# Patient Record
Sex: FEMALE
[De-identification: names, ages, dates, MRNs, and addresses within clinical notes are randomized per-mention and may not be internally consistent; named-entity substitution may affect disease eponyms.]

---

## 2017-10-10 ENCOUNTER — RESULT REVIEW (OUTPATIENT)
Age: 52
End: 2017-10-10

## 2017-10-20 ENCOUNTER — OUTPATIENT (OUTPATIENT)
Dept: OUTPATIENT SERVICES | Facility: HOSPITAL | Age: 52
LOS: 1 days | Discharge: HOME | End: 2017-10-20

## 2017-10-20 DIAGNOSIS — C50.919 MALIGNANT NEOPLASM OF UNSPECIFIED SITE OF UNSPECIFIED FEMALE BREAST: ICD-10-CM

## 2017-10-25 ENCOUNTER — RESULT REVIEW (OUTPATIENT)
Age: 52
End: 2017-10-25

## 2017-10-25 ENCOUNTER — APPOINTMENT (OUTPATIENT)
Dept: HEMATOLOGY ONCOLOGY | Facility: CLINIC | Age: 52
End: 2017-10-25
Payer: COMMERCIAL

## 2017-10-25 VITALS
OXYGEN SATURATION: 100 % | DIASTOLIC BLOOD PRESSURE: 72 MMHG | BODY MASS INDEX: 22.53 KG/M2 | RESPIRATION RATE: 16 BRPM | WEIGHT: 138.67 LBS | HEART RATE: 64 BPM | TEMPERATURE: 98.4 F | SYSTOLIC BLOOD PRESSURE: 105 MMHG

## 2017-10-25 LAB
BASOPHILS # BLD AUTO: 0.1 K/UL — SIGNIFICANT CHANGE UP (ref 0–0.2)
BASOPHILS NFR BLD AUTO: 1.1 % — SIGNIFICANT CHANGE UP (ref 0–2)
EOSINOPHIL # BLD AUTO: 0.1 K/UL — SIGNIFICANT CHANGE UP (ref 0–0.5)
EOSINOPHIL NFR BLD AUTO: 1.4 % — SIGNIFICANT CHANGE UP (ref 0–6)
HCT VFR BLD CALC: 39.2 % — SIGNIFICANT CHANGE UP (ref 34.5–45)
HGB BLD-MCNC: 14 G/DL — SIGNIFICANT CHANGE UP (ref 11.5–15.5)
LYMPHOCYTES # BLD AUTO: 2.4 K/UL — SIGNIFICANT CHANGE UP (ref 1–3.3)
LYMPHOCYTES # BLD AUTO: 39.1 % — SIGNIFICANT CHANGE UP (ref 13–44)
MCHC RBC-ENTMCNC: 31.9 PG — SIGNIFICANT CHANGE UP (ref 27–34)
MCHC RBC-ENTMCNC: 35.6 G/DL — SIGNIFICANT CHANGE UP (ref 32–36)
MCV RBC AUTO: 89.8 FL — SIGNIFICANT CHANGE UP (ref 80–100)
MONOCYTES # BLD AUTO: 0.5 K/UL — SIGNIFICANT CHANGE UP (ref 0–0.9)
MONOCYTES NFR BLD AUTO: 8.3 % — SIGNIFICANT CHANGE UP (ref 2–14)
NEUTROPHILS # BLD AUTO: 3 K/UL — SIGNIFICANT CHANGE UP (ref 1.8–7.4)
NEUTROPHILS NFR BLD AUTO: 50.1 % — SIGNIFICANT CHANGE UP (ref 43–77)
PLATELET # BLD AUTO: 222 K/UL — SIGNIFICANT CHANGE UP (ref 150–400)
RBC # BLD: 4.37 M/UL — SIGNIFICANT CHANGE UP (ref 3.8–5.2)
RBC # FLD: 10.9 % — SIGNIFICANT CHANGE UP (ref 10.3–14.5)
WBC # BLD: 6 K/UL — SIGNIFICANT CHANGE UP (ref 3.8–10.5)
WBC # FLD AUTO: 6 K/UL — SIGNIFICANT CHANGE UP (ref 3.8–10.5)

## 2017-10-25 PROCEDURE — 99214 OFFICE O/P EST MOD 30 MIN: CPT

## 2019-01-04 LAB
ALBUMIN SERPL ELPH-MCNC: 4.4 G/DL
ALP BLD-CCNC: 61 U/L
ALT SERPL-CCNC: 19 U/L
ANION GAP SERPL CALC-SCNC: 13 MMOL/L
AST SERPL-CCNC: 21 U/L
BILIRUB SERPL-MCNC: 0.3 MG/DL
BUN SERPL-MCNC: 17 MG/DL
CALCIUM SERPL-MCNC: 9.8 MG/DL
CHLORIDE SERPL-SCNC: 103 MMOL/L
CO2 SERPL-SCNC: 26 MMOL/L
CREAT SERPL-MCNC: 0.98 MG/DL
GLUCOSE SERPL-MCNC: 92 MG/DL
POTASSIUM SERPL-SCNC: 4.7 MMOL/L
PROT SERPL-MCNC: 7.1 G/DL
SODIUM SERPL-SCNC: 142 MMOL/L

## 2019-01-22 ENCOUNTER — OUTPATIENT (OUTPATIENT)
Dept: OUTPATIENT SERVICES | Facility: HOSPITAL | Age: 54
LOS: 1 days | Discharge: HOME | End: 2019-01-22

## 2019-01-22 DIAGNOSIS — C50.919 MALIGNANT NEOPLASM OF UNSPECIFIED SITE OF UNSPECIFIED FEMALE BREAST: ICD-10-CM

## 2019-01-28 ENCOUNTER — APPOINTMENT (OUTPATIENT)
Dept: HEMATOLOGY ONCOLOGY | Facility: CLINIC | Age: 54
End: 2019-01-28

## 2019-03-21 ENCOUNTER — OUTPATIENT (OUTPATIENT)
Dept: OUTPATIENT SERVICES | Facility: HOSPITAL | Age: 54
LOS: 1 days | Discharge: HOME | End: 2019-03-21

## 2019-03-21 DIAGNOSIS — C50.919 MALIGNANT NEOPLASM OF UNSPECIFIED SITE OF UNSPECIFIED FEMALE BREAST: ICD-10-CM

## 2019-03-25 ENCOUNTER — RESULT REVIEW (OUTPATIENT)
Age: 54
End: 2019-03-25

## 2019-03-25 ENCOUNTER — APPOINTMENT (OUTPATIENT)
Dept: HEMATOLOGY ONCOLOGY | Facility: CLINIC | Age: 54
End: 2019-03-25
Payer: COMMERCIAL

## 2019-03-25 VITALS
OXYGEN SATURATION: 100 % | DIASTOLIC BLOOD PRESSURE: 76 MMHG | WEIGHT: 144.84 LBS | BODY MASS INDEX: 23.53 KG/M2 | SYSTOLIC BLOOD PRESSURE: 115 MMHG | RESPIRATION RATE: 24 BRPM | HEART RATE: 64 BPM | TEMPERATURE: 98.1 F

## 2019-03-25 DIAGNOSIS — E78.00 PURE HYPERCHOLESTEROLEMIA, UNSPECIFIED: ICD-10-CM

## 2019-03-25 DIAGNOSIS — M85.80 OTHER SPECIFIED DISORDERS OF BONE DENSITY AND STRUCTURE, UNSPECIFIED SITE: ICD-10-CM

## 2019-03-25 LAB
BASOPHILS # BLD AUTO: 0.1 K/UL — SIGNIFICANT CHANGE UP (ref 0–0.2)
BASOPHILS NFR BLD AUTO: 1.2 % — SIGNIFICANT CHANGE UP (ref 0–2)
EOSINOPHIL # BLD AUTO: 0.1 K/UL — SIGNIFICANT CHANGE UP (ref 0–0.5)
EOSINOPHIL NFR BLD AUTO: 1.7 % — SIGNIFICANT CHANGE UP (ref 0–6)
HCT VFR BLD CALC: 39 % — SIGNIFICANT CHANGE UP (ref 34.5–45)
HGB BLD-MCNC: 13.5 G/DL — SIGNIFICANT CHANGE UP (ref 11.5–15.5)
LYMPHOCYTES # BLD AUTO: 2 K/UL — SIGNIFICANT CHANGE UP (ref 1–3.3)
LYMPHOCYTES # BLD AUTO: 36.1 % — SIGNIFICANT CHANGE UP (ref 13–44)
MCHC RBC-ENTMCNC: 30.8 PG — SIGNIFICANT CHANGE UP (ref 27–34)
MCHC RBC-ENTMCNC: 34.6 G/DL — SIGNIFICANT CHANGE UP (ref 32–36)
MCV RBC AUTO: 88.9 FL — SIGNIFICANT CHANGE UP (ref 80–100)
MONOCYTES # BLD AUTO: 0.4 K/UL — SIGNIFICANT CHANGE UP (ref 0–0.9)
MONOCYTES NFR BLD AUTO: 7.3 % — SIGNIFICANT CHANGE UP (ref 2–14)
NEUTROPHILS # BLD AUTO: 3 K/UL — SIGNIFICANT CHANGE UP (ref 1.8–7.4)
NEUTROPHILS NFR BLD AUTO: 53.7 % — SIGNIFICANT CHANGE UP (ref 43–77)
PLATELET # BLD AUTO: 229 K/UL — SIGNIFICANT CHANGE UP (ref 150–400)
RBC # BLD: 4.39 M/UL — SIGNIFICANT CHANGE UP (ref 3.8–5.2)
RBC # FLD: 11.5 % — SIGNIFICANT CHANGE UP (ref 10.3–14.5)
WBC # BLD: 5.5 K/UL — SIGNIFICANT CHANGE UP (ref 3.8–10.5)
WBC # FLD AUTO: 5.5 K/UL — SIGNIFICANT CHANGE UP (ref 3.8–10.5)

## 2019-03-25 PROCEDURE — 99214 OFFICE O/P EST MOD 30 MIN: CPT

## 2019-03-26 LAB
25(OH)D3 SERPL-MCNC: 21.6 NG/ML
ALBUMIN SERPL ELPH-MCNC: 4.7 G/DL
ALP BLD-CCNC: 65 U/L
ALT SERPL-CCNC: 16 U/L
ANION GAP SERPL CALC-SCNC: 9 MMOL/L
AST SERPL-CCNC: 19 U/L
BILIRUB SERPL-MCNC: 0.3 MG/DL
BUN SERPL-MCNC: 15 MG/DL
CALCIUM SERPL-MCNC: 9.8 MG/DL
CHLORIDE SERPL-SCNC: 104 MMOL/L
CHOLEST SERPL-MCNC: 237 MG/DL
CHOLEST/HDLC SERPL: 4.5 RATIO
CO2 SERPL-SCNC: 28 MMOL/L
CREAT SERPL-MCNC: 0.8 MG/DL
GLUCOSE SERPL-MCNC: 93 MG/DL
HDLC SERPL-MCNC: 53 MG/DL
LDLC SERPL CALC-MCNC: 150 MG/DL
POTASSIUM SERPL-SCNC: 4.6 MMOL/L
PROT SERPL-MCNC: 6.9 G/DL
SODIUM SERPL-SCNC: 141 MMOL/L
TRIGL SERPL-MCNC: 168 MG/DL

## 2019-03-26 NOTE — HISTORY OF PRESENT ILLNESS
[de-identified] : This is a 51-year-old woman with history of breast carcinoma. 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 The tumor was T1c, N1mic with micrometastases in one lymph node. ER and [place] were positive. The patient was tested positive for BRCA 2. She underwent chemotherapy with Taxotere Cytoxan and bilateral mastectomy with reconstruction and bilateral oophorectomy. The patient completed 5 years of anastrozole. She has had no evidence of recurrent disease. [de-identified] : Patient is here for yearly, f/u.  She feels fine without any new complaints. Appetite is good. Denies any pain or swelling. Last colonoscopy was done in 2016 with no abnormal pathology found. Patient has osteopenia and she take Vitamin D 1000 units daily.

## 2019-03-26 NOTE — REVIEW OF SYSTEMS
[Negative] : Allergic/Immunologic [FreeTextEntry7] : occ heartburn [FreeTextEntry9] : joint pain better

## 2019-03-26 NOTE — PHYSICAL EXAM
[Restricted in physically strenuous activity but ambulatory and able to carry out work of a light or sedentary nature] : Status 1- Restricted in physically strenuous activity but ambulatory and able to carry out work of a light or sedentary nature, e.g., light house work, office work [Normal] : affect appropriate [de-identified] : bilateral mastectomies with inplants

## 2019-03-26 NOTE — ASSESSMENT
[Curative] : Goals of care discussed with patient: Curative [Palliative Care Plan] : not applicable at this time [FreeTextEntry1] : Pt with no evidence of recurrent disease, continue surveillance. Pt post chemo for stage II breast ca and AI therapy. Pt to do bone density testing at her PCP office . pt has hx of pancreatic cysts  and last MRI in 12/16 showed no change. Ordered repeat MRI for pancreatic cyst, f/u.  Pt to RTO in 1 year.Pt has hx of pos BRCA2 breast ca and s/p bilat eral mastectomies and BSO.

## 2019-06-03 DIAGNOSIS — K86.2 CYST OF PANCREAS: ICD-10-CM

## 2019-06-03 DIAGNOSIS — C50.919 MALIGNANT NEOPLASM OF UNSPECIFIED SITE OF UNSPECIFIED FEMALE BREAST: ICD-10-CM

## 2019-06-03 LAB — CANCER AG27-29 SERPL-ACNC: 17.3 U/ML

## 2024-08-19 ENCOUNTER — APPOINTMENT (OUTPATIENT)
Dept: ORTHOPEDIC SURGERY | Facility: CLINIC | Age: 59
End: 2024-08-19
Payer: COMMERCIAL

## 2024-08-19 VITALS — HEIGHT: 66 IN | BODY MASS INDEX: 22.98 KG/M2 | WEIGHT: 143 LBS

## 2024-08-19 DIAGNOSIS — M54.50 LOW BACK PAIN, UNSPECIFIED: ICD-10-CM

## 2024-08-19 DIAGNOSIS — M54.16 RADICULOPATHY, LUMBAR REGION: ICD-10-CM

## 2024-08-19 PROCEDURE — 72100 X-RAY EXAM L-S SPINE 2/3 VWS: CPT

## 2024-08-19 PROCEDURE — 72170 X-RAY EXAM OF PELVIS: CPT

## 2024-08-19 PROCEDURE — 99204 OFFICE O/P NEW MOD 45 MIN: CPT

## 2024-08-19 RX ORDER — METHYLPREDNISOLONE 4 MG/1
4 TABLET ORAL
Qty: 1 | Refills: 1 | Status: ACTIVE | COMMUNITY
Start: 2024-08-19 | End: 1900-01-01

## 2024-08-19 NOTE — DISCUSSION/SUMMARY
[de-identified] : Lumbar scoliosis. Lumbar strain and sprain. Right lumbar radiculopathy. Discussed all options. MDPx2. Lumbar MRI referral. F/U after MRI. All options discussed including rest, medicine, home exercise, acupuncture, Chiropractic care, Physical Therapy, Pain management, and last resort surgery. All questions were answered, all alternatives discussed and the patient is in complete agreement with the treatment plan which the patient contributed to and discussed with me through the shared decision making process. Follow-up appointment as instructed. Any issues and the patient will call or come in sooner.

## 2024-08-19 NOTE — ADDENDUM
[FreeTextEntry1] : This note was written by Lopez Ley on 08/19/2024 acting as scribe for Dr. Sarath Quinn M.D.  I, Sarath Quinn MD, have read and attest that all the information, medical decision making and discharge instructions within are true and accurate.
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
## 2024-08-19 NOTE — PHYSICAL EXAM
[Antalgic] : antalgic [SLR] : positive straight leg raise [Berumen's Sign] : negative Berumen's sign [Pronator Drift] : negative pronator drift [de-identified] : 5 out of 5 motor strength, sensation is intact and symmetrical full range of motion flexion extension and rotation, no palpatory tenderness full range of motion of hips knees shoulders and elbows (all four extremities), no atrophy, negative straight leg raise, no pathological reflexes, no swelling, normal ambulation, no apparent distress skin is intact, no palpable lymph nodes, no upper or lower extremity instability, alert and oriented x3 and normal mood. Normal finger-to nose test.  [de-identified] : XR AP Lat Lumbar 08/19/2024 -scoliosis- reviewed with patient.    XR AP Pelvis 8/19/24 -adequate- reviewed with patient.

## 2024-08-21 ENCOUNTER — APPOINTMENT (OUTPATIENT)
Dept: MRI IMAGING | Facility: IMAGING CENTER | Age: 59
End: 2024-08-21
Payer: COMMERCIAL

## 2024-08-21 ENCOUNTER — OUTPATIENT (OUTPATIENT)
Dept: OUTPATIENT SERVICES | Facility: HOSPITAL | Age: 59
LOS: 1 days | End: 2024-08-21
Payer: COMMERCIAL

## 2024-08-21 DIAGNOSIS — M54.50 LOW BACK PAIN, UNSPECIFIED: ICD-10-CM

## 2024-08-21 PROCEDURE — 72148 MRI LUMBAR SPINE W/O DYE: CPT | Mod: 26

## 2024-08-21 PROCEDURE — 72148 MRI LUMBAR SPINE W/O DYE: CPT

## 2024-08-22 ENCOUNTER — TRANSCRIPTION ENCOUNTER (OUTPATIENT)
Age: 59
End: 2024-08-22

## 2024-08-25 ENCOUNTER — NON-APPOINTMENT (OUTPATIENT)
Age: 59
End: 2024-08-25

## 2024-08-26 ENCOUNTER — APPOINTMENT (OUTPATIENT)
Dept: ORTHOPEDIC SURGERY | Facility: CLINIC | Age: 59
End: 2024-08-26
Payer: COMMERCIAL

## 2024-08-26 VITALS — WEIGHT: 140 LBS | BODY MASS INDEX: 22.5 KG/M2 | HEIGHT: 66 IN

## 2024-08-26 DIAGNOSIS — M79.10 MYALGIA, UNSPECIFIED SITE: ICD-10-CM

## 2024-08-26 DIAGNOSIS — M41.9 SCOLIOSIS, UNSPECIFIED: ICD-10-CM

## 2024-08-26 DIAGNOSIS — M60.9 MYOSITIS, UNSPECIFIED: ICD-10-CM

## 2024-08-26 DIAGNOSIS — M51.36 OTHER INTERVERTEBRAL DISC DEGENERATION, LUMBAR REGION: ICD-10-CM

## 2024-08-26 PROCEDURE — 99214 OFFICE O/P EST MOD 30 MIN: CPT | Mod: 25

## 2024-08-26 PROCEDURE — 20552 NJX 1/MLT TRIGGER POINT 1/2: CPT

## 2024-08-26 NOTE — PHYSICAL EXAM
[Normal] : Gait: normal [Berumen's Sign] : negative Berumen's sign [Pronator Drift] : negative pronator drift [SLR] : negative straight leg raise [de-identified] : 5 out of 5 motor strength, sensation is intact and symmetrical full range of motion flexion extension and rotation, no palpatory tenderness full range of motion of hips knees shoulders and elbows (all four extremities), no atrophy, negative straight leg raise, no pathological reflexes, no swelling, normal ambulation, no apparent distress skin is intact, no palpable lymph nodes, no upper or lower extremity instability, alert and oriented x3 and normal mood. Normal finger-to nose test. No upper motor neuron findings. Right SI joint pain. [de-identified] : I reviewed, interpreted and clinically correlated the following outside imaging studies, MR SPINE LUMBAR  - ORDERED BY: TRINA COTTRELL   PROCEDURE DATE:  08/21/2024    INTERPRETATION:  CLINICAL INFORMATION: Back pain  ADDITIONAL CLINICAL INFORMATION: Scoliosis M41.9  TECHNIQUE: Multiplanar, multisequence MRI was performed of the lumbar spine. IV Contrast:  PRIOR STUDIES: No priors available for comparison.  FINDINGS:  LOCALIZER: No additional findings. BONES: There is no fracture or bone marrow edema. ALIGNMENT: There is S-shaped curvature of the lumbar spine. SACROILIAC JOINTS/SACRUM: There is no sacral fracture. The SI joints are partially visualized but are intact. CONUS AND CAUDA EQUINA: The distal cord and conus are normal in signal. Conus terminates at L1. VISUALIZED INTRAPELVIC/INTRA-ABDOMINAL SOFT TISSUES: Normal. PARASPINAL SOFT TISSUES: Normal.   INDIVIDUAL LEVELS:  LOWER THORACIC SPINE: No spinal canal or neuroforaminal stenosis.  L1-L2: Broad-based posterior disc bulge and mild bilateral facet arthropathy result in moderate to severe right neural foraminal narrowing but no significant left neural foraminal narrowing or central canal narrowing. L2-L3: Broad-based posterior disc bulge with superimposed right intraforaminal protrusion in conjunction with mild bilateral facet arthropathy result in mild to moderate right neural foraminal narrowing but no significant left neural foraminal narrowing or central canal narrowing. L3-L4: Broad-based posterior disc bulge and moderate bilateral facet arthropathy result in moderate left neural foraminal narrowing, mild to moderate right neural foraminal narrowing but no significant central canal narrowing. L4-L5: Broad-based posterior disc bulge with superimposed left intraforaminal protrusion in conjunction with moderate bilateral facet arthropathy results in moderate left neural foraminal narrowing, mild to moderate right neural foraminal narrowing and mild central canal narrowing. There is left lateral recess stenosis. L5-S1: Small posterior disc bulge and moderate bilateral facet arthropathy result in moderate left neural foraminal narrowing but no significant right neural foraminal narrowing or central canal narrowing.   IMPRESSION:  Multilevel discogenic degenerative disease and facet arthropathy of the lumbar spine, most pronounced at L4-L5 where there is moderate left neural foraminal narrowing, mild to moderate right neural foraminal narrowing and mild central canal narrowing. Additional varying degrees of central canal and neural foraminal narrowing, as above.  --- End of Report ---   XR AP Lat Lumbar 08/19/2024 -scoliosis- reviewed with patient.    XR AP Pelvis 8/19/24 -adequate- reviewed with patient.
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
## 2024-08-26 NOTE — ADDENDUM
[FreeTextEntry1] : This note was written by Lopez Ley on 08/26/2024 acting as scribe for Dr. Sarath Quinn M.D.  I, Sarath Quinn MD, have read and attest that all the information, medical decision making and discharge instructions within are true and accurate.

## 2024-08-26 NOTE — DISCUSSION/SUMMARY
[de-identified] : Lumbar scoliosis. Lumbar disc degenerative disease. Right sacroiliitis. Discussed all options. Referral for physical therapy. I offered an injection after all risks were explained including allergic reaction to an infection under sterile conditions 1 mg of Depo-Medrol and 2 cc of 1% Lidocaine without epinephrine was injected into the painful site. The patient tolerated the procedure well and received significant relief following the injection. (right SI) All options discussed including rest, medicine, home exercise, acupuncture, Chiropractic care, Physical Therapy, Pain management, and last resort surgery. All questions were answered, all alternatives discussed and the patient is in complete agreement with the treatment plan which the patient contributed to and discussed with me through the shared decision making process. Follow-up appointment as instructed. Any issues and the patient will call or come in sooner.

## 2024-08-29 ENCOUNTER — TRANSCRIPTION ENCOUNTER (OUTPATIENT)
Age: 59
End: 2024-08-29

## 2024-08-30 ENCOUNTER — TRANSCRIPTION ENCOUNTER (OUTPATIENT)
Age: 59
End: 2024-08-30

## 2024-09-03 ENCOUNTER — TRANSCRIPTION ENCOUNTER (OUTPATIENT)
Age: 59
End: 2024-09-03

## 2024-09-06 ENCOUNTER — OUTPATIENT (OUTPATIENT)
Dept: OUTPATIENT SERVICES | Facility: HOSPITAL | Age: 59
LOS: 1 days | End: 2024-09-06
Payer: COMMERCIAL

## 2024-09-06 ENCOUNTER — APPOINTMENT (OUTPATIENT)
Dept: MRI IMAGING | Facility: CLINIC | Age: 59
End: 2024-09-06

## 2024-09-06 DIAGNOSIS — M54.50 LOW BACK PAIN, UNSPECIFIED: ICD-10-CM

## 2024-09-06 DIAGNOSIS — Z00.00 ENCOUNTER FOR GENERAL ADULT MEDICAL EXAMINATION WITHOUT ABNORMAL FINDINGS: ICD-10-CM

## 2024-09-06 PROCEDURE — 72195 MRI PELVIS W/O DYE: CPT | Mod: 26

## 2024-09-06 PROCEDURE — 72195 MRI PELVIS W/O DYE: CPT

## 2024-09-16 ENCOUNTER — APPOINTMENT (OUTPATIENT)
Dept: ORTHOPEDIC SURGERY | Facility: CLINIC | Age: 59
End: 2024-09-16

## 2024-09-17 ENCOUNTER — TRANSCRIPTION ENCOUNTER (OUTPATIENT)
Age: 59
End: 2024-09-17

## 2024-09-23 ENCOUNTER — APPOINTMENT (OUTPATIENT)
Dept: ORTHOPEDIC SURGERY | Facility: CLINIC | Age: 59
End: 2024-09-23
Payer: COMMERCIAL

## 2024-09-23 VITALS — BODY MASS INDEX: 22.5 KG/M2 | WEIGHT: 140 LBS | HEIGHT: 66 IN

## 2024-09-23 DIAGNOSIS — M51.36 OTHER INTERVERTEBRAL DISC DEGENERATION, LUMBAR REGION: ICD-10-CM

## 2024-09-23 PROCEDURE — 99214 OFFICE O/P EST MOD 30 MIN: CPT

## 2024-09-23 NOTE — ADDENDUM
[FreeTextEntry1] : This note was written by Lopez Ley on 09/23/2024 acting as scribe for Dr. Sarath Quinn M.D.  I, Sarath Quinn MD, have read and attest that all the information, medical decision making and discharge instructions within are true and accurate.

## 2024-09-23 NOTE — DISCUSSION/SUMMARY
[de-identified] : Lumbar scoliosis. Lumbar disc degenerative disease. Moderate right gluteus medius insertional tendinosis. Discussed all options. She will see Dr. Zapata for neurological evaluation. Getting EMG lowers if normal will consider hip area injection under US guidance. If EMG + may consider lumbar CAMILLE.  All options discussed including rest, medicine, home exercise, acupuncture, Chiropractic care, Physical Therapy, Pain management, and last resort surgery. All questions were answered, all alternatives discussed and the patient is in complete agreement with the treatment plan which the patient contributed to and discussed with me through the shared decision making process. Follow-up appointment as instructed. Any issues and the patient will call or come in sooner.

## 2024-09-23 NOTE — PHYSICAL EXAM
[Normal] : Gait: normal [Berumen's Sign] : negative Berumen's sign [Pronator Drift] : negative pronator drift [SLR] : negative straight leg raise [de-identified] : 5 out of 5 motor strength, sensation is intact and symmetrical full range of motion flexion extension and rotation, no palpatory tenderness full range of motion of hips knees shoulders and elbows (all four extremities), no atrophy, negative straight leg raise, no pathological reflexes, no swelling, normal ambulation, no apparent distress skin is intact, no palpable lymph nodes, no upper or lower extremity instability, alert and oriented x3 and normal mood. Normal finger-to nose test. No upper motor neuron findings. Pain over right troch/buttocks region. [de-identified] : I reviewed, interpreted and clinically correlated the following outside imaging studies, MR PELVIS  - ORDERED BY: TRINA COTTRELL   PROCEDURE DATE:  09/06/2024    INTERPRETATION:  EXAMINATION: MR PELVIS  CLINICAL INDICATION:Evaluate for right hip pain  COMPARISON: None. Correlation with lumbar spine MRI dated 8/21/2024.  TECHNIQUE: MRI of the musculoskeletal pelvis was performed without intravenous contrast.  INTERPRETATION: Joint space: There is no right hip joint effusion. There is a trace left hip joint effusion.  Bones and articular cartilage: There is no evidence of fracture or avascular necrosis. Both hips demonstrate mild diffuse cartilage thinning.  Muscles, tendons and bursae: Severe left and moderate right fatty atrophy of the quadratus femoris muscles are evident, with narrowing of the bilateral ischiofemoral intervals and mild muscle edema, worse on the left. There is moderate right gluteus medius insertional tendinosis with adjacent soft tissue edema.  Acetabular labrum: There is a focal nondisplaced tear at the base of the right anterosuperior labrum.  Lower lumbar spine: Partly imaged. Dextrocurvature.  IMPRESSION:  1.  Findings of bilateral ischiofemoral impingement, worse on the left.  2.  Moderate right gluteus medius insertional tendinosis, with adjacent soft tissue edema.  3.  Mild diffuse cartilage thinning at the bilateral hip joints.  4.  Focal right hip anterosuperior labral tear.  5.  Trace left hip joint effusion.  --- End of Report ---    MR SPINE LUMBAR  - ORDERED BY: TRINA COTTRELL   PROCEDURE DATE:  08/21/2024    INTERPRETATION:  CLINICAL INFORMATION: Back pain  ADDITIONAL CLINICAL INFORMATION: Scoliosis M41.9  TECHNIQUE: Multiplanar, multisequence MRI was performed of the lumbar spine. IV Contrast:  PRIOR STUDIES: No priors available for comparison.  FINDINGS:  LOCALIZER: No additional findings. BONES: There is no fracture or bone marrow edema. ALIGNMENT: There is S-shaped curvature of the lumbar spine. SACROILIAC JOINTS/SACRUM: There is no sacral fracture. The SI joints are partially visualized but are intact. CONUS AND CAUDA EQUINA: The distal cord and conus are normal in signal. Conus terminates at L1. VISUALIZED INTRAPELVIC/INTRA-ABDOMINAL SOFT TISSUES: Normal. PARASPINAL SOFT TISSUES: Normal.   INDIVIDUAL LEVELS:  LOWER THORACIC SPINE: No spinal canal or neuroforaminal stenosis.  L1-L2: Broad-based posterior disc bulge and mild bilateral facet arthropathy result in moderate to severe right neural foraminal narrowing but no significant left neural foraminal narrowing or central canal narrowing. L2-L3: Broad-based posterior disc bulge with superimposed right intraforaminal protrusion in conjunction with mild bilateral facet arthropathy result in mild to moderate right neural foraminal narrowing but no significant left neural foraminal narrowing or central canal narrowing. L3-L4: Broad-based posterior disc bulge and moderate bilateral facet arthropathy result in moderate left neural foraminal narrowing, mild to moderate right neural foraminal narrowing but no significant central canal narrowing. L4-L5: Broad-based posterior disc bulge with superimposed left intraforaminal protrusion in conjunction with moderate bilateral facet arthropathy results in moderate left neural foraminal narrowing, mild to moderate right neural foraminal narrowing and mild central canal narrowing. There is left lateral recess stenosis. L5-S1: Small posterior disc bulge and moderate bilateral facet arthropathy result in moderate left neural foraminal narrowing but no significant right neural foraminal narrowing or central canal narrowing.   IMPRESSION:  Multilevel discogenic degenerative disease and facet arthropathy of the lumbar spine, most pronounced at L4-L5 where there is moderate left neural foraminal narrowing, mild to moderate right neural foraminal narrowing and mild central canal narrowing. Additional varying degrees of central canal and neural foraminal narrowing, as above.  --- End of Report ---   XR AP Lat Lumbar 08/19/2024 -scoliosis- reviewed with patient.    XR AP Pelvis 8/19/24 -adequate- reviewed with patient.
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
## 2024-10-25 ENCOUNTER — APPOINTMENT (OUTPATIENT)
Dept: MRI IMAGING | Facility: CLINIC | Age: 59
End: 2024-10-25

## 2024-10-25 ENCOUNTER — OUTPATIENT (OUTPATIENT)
Dept: OUTPATIENT SERVICES | Facility: HOSPITAL | Age: 59
LOS: 1 days | End: 2024-10-25
Payer: COMMERCIAL

## 2024-10-25 DIAGNOSIS — Z00.8 ENCOUNTER FOR OTHER GENERAL EXAMINATION: ICD-10-CM

## 2024-10-25 PROCEDURE — 73721 MRI JNT OF LWR EXTRE W/O DYE: CPT

## 2024-10-25 PROCEDURE — 73721 MRI JNT OF LWR EXTRE W/O DYE: CPT | Mod: 26,RT
